# Patient Record
Sex: FEMALE | Race: WHITE | NOT HISPANIC OR LATINO | Employment: OTHER | URBAN - NONMETROPOLITAN AREA
[De-identification: names, ages, dates, MRNs, and addresses within clinical notes are randomized per-mention and may not be internally consistent; named-entity substitution may affect disease eponyms.]

---

## 2022-08-23 ENCOUNTER — OFFICE VISIT (OUTPATIENT)
Dept: CARDIOLOGY | Facility: CLINIC | Age: 82
End: 2022-08-23

## 2022-08-23 VITALS
HEART RATE: 67 BPM | DIASTOLIC BLOOD PRESSURE: 72 MMHG | BODY MASS INDEX: 27.99 KG/M2 | OXYGEN SATURATION: 97 % | HEIGHT: 65 IN | WEIGHT: 168 LBS | SYSTOLIC BLOOD PRESSURE: 140 MMHG

## 2022-08-23 DIAGNOSIS — R06.09 DYSPNEA ON EXERTION: ICD-10-CM

## 2022-08-23 DIAGNOSIS — I20.8 ATYPICAL ANGINA: Primary | ICD-10-CM

## 2022-08-23 DIAGNOSIS — E78.2 MIXED HYPERLIPIDEMIA: ICD-10-CM

## 2022-08-23 DIAGNOSIS — I10 PRIMARY HYPERTENSION: ICD-10-CM

## 2022-08-23 PROCEDURE — 99204 OFFICE O/P NEW MOD 45 MIN: CPT | Performed by: EMERGENCY MEDICINE

## 2022-08-23 PROCEDURE — 93000 ELECTROCARDIOGRAM COMPLETE: CPT | Performed by: EMERGENCY MEDICINE

## 2022-08-23 RX ORDER — LOSARTAN POTASSIUM 50 MG/1
100 TABLET ORAL EVERY MORNING
COMMUNITY
Start: 2022-07-22

## 2022-08-23 RX ORDER — TRIAMTERENE AND HYDROCHLOROTHIAZIDE 75; 50 MG/1; MG/1
1 TABLET ORAL DAILY
Qty: 90 TABLET | Refills: 3 | Status: SHIPPED | OUTPATIENT
Start: 2022-08-23 | End: 2022-12-08

## 2022-08-23 RX ORDER — ASPIRIN 325 MG
TABLET, DELAYED RELEASE (ENTERIC COATED) ORAL
COMMUNITY

## 2022-08-23 RX ORDER — ATENOLOL 50 MG/1
1 TABLET ORAL DAILY
COMMUNITY
Start: 2022-04-18

## 2022-08-23 RX ORDER — ROSUVASTATIN CALCIUM 10 MG/1
10 TABLET, COATED ORAL EVERY MORNING
COMMUNITY
Start: 2022-06-10

## 2022-08-23 RX ORDER — LORAZEPAM 0.5 MG/1
TABLET ORAL
COMMUNITY
Start: 2022-08-17

## 2022-08-23 RX ORDER — LEVOTHYROXINE SODIUM 88 UG/1
88 TABLET ORAL EVERY MORNING
COMMUNITY
Start: 2022-07-01

## 2022-08-23 RX ORDER — DULOXETIN HYDROCHLORIDE 60 MG/1
60 CAPSULE, DELAYED RELEASE ORAL DAILY
COMMUNITY
Start: 2022-07-15

## 2022-08-23 RX ORDER — FOLIC ACID 1 MG/1
1000 TABLET ORAL EVERY MORNING
COMMUNITY
Start: 2022-07-01

## 2022-08-23 RX ORDER — PANTOPRAZOLE SODIUM 40 MG/1
1 TABLET, DELAYED RELEASE ORAL DAILY
COMMUNITY
Start: 2022-05-02

## 2022-08-23 NOTE — PROGRESS NOTES
[facility] - CARDIOLOGY  New Patient Initial Outpatient [facility]    Primary Care Physician: Sienna Allison APRN    Subjective     Chief Complaint   Patient presents with   • Hypertension     NEW PT         History of Present Illness    Vannessa Franz is an 81-year-old female who presents for an initial evaluation. LEE ANN Villarreal is the patient's primary care physician. She is accompanied by an adult male.    Hypertension:  The patient states that she began to notice her blood pressure was increasing earlier in the summer. She states that her nephew passed away, and this may have affected her blood pressure. She states that she measured her blood pressure, and she noticed that her systolic blood pressure was 184 to 186 mmHg. She made an appointment with her primary care physician, and she was prescribed Cozaar. She takes a half pill twice per day, and this has helped. The patient's blood pressure log states that on 07/23/2022, her systolic blood pressure was in the 150 to 160 mmHg range. This was after she began the medication. Now, her systolic blood pressure is in the 130 to 150 systolic mmHg range. Her primary care physician referred the patient to cardiology.     Cardiac symptoms:  The patient denies any dyspnea or chest pain. She states that she does experience muscle spasms in her shoulders, and is unsure if this is related to cardiac issue. Her primary care physician stated that her symptoms could be caused by arthritis, and she was encouraged to go to the emergency room if she is unable to recover from the spasms. She has never had any cardiac testing done.    Medication management:  The patient takes aspirin 325 mg, atenolol, and Synthroid. She was also prescribed Cozaar 50 mg, but she takes a half pill twice daily currently. She also takes a half pill of Crestor 10 mg.    Health maintenance:  The patient denies any abnormal bleeding.    Anxiety:  The patient states that she has experienced  anxiety for an extended amount of time.     Lower extremity edema:  The patient states that she experiences swelling in her right leg. She states that the deep vein thrombosis was in her right leg as well. She normally wears compression socks, but she did not wear them today, and she notices her edema.    Medical history:  The patient had a transient ischemic attack 25 years ago. She has also had kidney issues in the past. She had deep vein thrombosis and thrombosis in her right eye several years ago. She was never on an anticoagulant other than aspirin following discharge from her hospital stay. While she was hospitalized, she had anticoagulant injections. She does not have clear vision in her right eye, and she has had surgery done by Dr. Healy. The patient is not aware of any cardiac issues. She has never been told that she has coronary artery disease, but atherosclerosis is listed in her chart. The patient also has arthritis.    Family history:  The patient's father  from a myocardial infarction. Her mother had frequent thrombosis and chronic bronchitis. The patient's mother was a non-smoker. The patient's brother  at 48 years old from a myocardial infarction. Her 2 brothers also have or had diabetes mellitus.     Review of Systems   Constitutional: Negative for diaphoresis, fever and malaise/fatigue.   HENT: Negative for congestion.    Eyes: Negative for vision loss in left eye and vision loss in right eye.   Cardiovascular: Positive for leg swelling. Negative for chest pain, claudication, dyspnea on exertion, irregular heartbeat, orthopnea, palpitations and syncope.   Respiratory: Negative for cough, shortness of breath and wheezing.    Hematologic/Lymphatic: Negative for adenopathy.   Skin: Negative for rash.   Musculoskeletal: Negative for joint pain and joint swelling.   Gastrointestinal: Negative for abdominal pain, diarrhea, nausea and vomiting.   Neurological: Negative for excessive daytime  "sleepiness, dizziness, focal weakness, light-headedness, numbness and weakness.   Psychiatric/Behavioral: Negative for depression. The patient is nervous/anxious. The patient does not have insomnia.         Otherwise complete ROS reviewed and negative except as mentioned in the HPI.      Past Medical History: No past medical history on file.    Past Surgical History:      Family History: family history includes Heart attack in her father.    Social History:  reports that she has never smoked. She has never used smokeless tobacco. She reports that she does not drink alcohol and does not use drugs.    Medications:  Prior to Admission medications    Medication Sig Start Date End Date Taking? Authorizing Provider   aspirin  MG tablet Take  by mouth.   Yes Damian Blackmon MD   atenolol (TENORMIN) 50 MG tablet Take 1 tablet by mouth Daily. 4/18/22  Yes Damian Blackmon MD   DULoxetine (CYMBALTA) 60 MG capsule Take 60 mg by mouth Daily. 7/15/22  Yes Damian Blackmon MD   folic acid (FOLVITE) 1 MG tablet Take 1,000 mcg by mouth Every Morning. 7/1/22  Yes Damian Blackmon MD   levothyroxine (SYNTHROID, LEVOTHROID) 88 MCG tablet Take 88 mcg by mouth Every Morning. 7/1/22  Yes Damian Blackmon MD   LORazepam (ATIVAN) 0.5 MG tablet TAKE 1 TO 2 TABLETS BY MOUTH TWICE A DAY AS NEEDED FOR ANXIETY 8/17/22  Yes Damian Blackmon MD   losartan (COZAAR) 50 MG tablet Take 50 mg by mouth Every Morning. 7/22/22  Yes Damian Blackmon MD   pantoprazole (PROTONIX) 40 MG EC tablet Take 1 tablet by mouth Daily. 5/2/22  Yes Damian Blackmon MD   rosuvastatin (CRESTOR) 10 MG tablet Take 10 mg by mouth Every Morning. 6/10/22  Yes Damian Blackmon MD   triamterene-hydrochlorothiazide (MAXZIDE) 75-50 MG per tablet Take 1 tablet by mouth Daily. 8/23/22   Lm Levin DO     Allergies:  No Known Allergies    Objective     Vital Signs: /72   Pulse 67   Ht 165.1 cm (65\")   Wt " 76.2 kg (168 lb)   SpO2 97%   BMI 27.96 kg/m²     Vitals and nursing note reviewed.   Constitutional:       Appearance: Normal and healthy appearance. Well-developed and not in distress.   Eyes:      Extraocular Movements: Extraocular movements intact.      Pupils: Pupils are equal, round, and reactive to light.   HENT:      Head: Normocephalic and atraumatic.    Mouth/Throat:      Pharynx: Oropharynx is clear.   Neck:      Vascular: JVD normal.      Trachea: Trachea normal.   Pulmonary:      Effort: Pulmonary effort is normal.      Breath sounds: Normal breath sounds. No wheezing. No rhonchi. No rales.   Cardiovascular:      PMI at left midclavicular line. Normal rate. Regular rhythm. Normal S1. Normal S2.      Murmurs: There is a grade 2/6 systolic murmur.      No gallop. No rub.   Pulses:     Dorsalis pedis: 2+ bilaterally.     Posterior tibial: 2+ bilaterally.  Abdominal:      General: Bowel sounds are normal.      Palpations: Abdomen is soft.      Tenderness: There is no abdominal tenderness.   Musculoskeletal: Normal range of motion.      Cervical back: Normal range of motion and neck supple. Skin:     General: Skin is warm and dry.      Capillary Refill: Capillary refill takes less than 2 seconds.   Feet:      Right foot:      Skin integrity: Skin integrity normal.      Left foot:      Skin integrity: Skin integrity normal.   Neurological:      Mental Status: Alert and oriented to person, place and time.      Cranial Nerves: Cranial nerves are intact.      Sensory: Sensation is intact.      Motor: Motor function is intact.      Coordination: Coordination is intact.   Psychiatric:         Speech: Speech normal.         Behavior: Behavior is cooperative.         Results Reviewed:      ECG 12 Lead    Date/Time: 8/24/2022 12:10 PM  Performed by: Lm Levin DO  Authorized by: Lm Levin DO   Comparison: compared with previous ECG   Similar to previous ECG  Rhythm: sinus  bradycardia  Rate: bradycardic  Conduction: conduction normal  ST Segments: ST segments normal  T Waves: T waves normal  QRS axis: normal  Other findings: left atrial abnormality    Clinical impression: abnormal EKG              No results found for: CHOL, TRIG, HDL, VLDL, LDLHDL  No results found for: HGBA1C    Assessment / Plan        Problem List Items Addressed This Visit        Cardiac and Vasculature    Atypical angina (HCC) - Primary    Relevant Medications    atenolol (TENORMIN) 50 MG tablet    Other Relevant Orders    Stress Test With Myocardial Perfusion (1 Day)    Dyspnea on exertion    Relevant Orders    Adult Transthoracic Echo Complete W/ Cont if Necessary Per Protocol    Primary hypertension    Relevant Medications    atenolol (TENORMIN) 50 MG tablet    losartan (COZAAR) 50 MG tablet    triamterene-hydrochlorothiazide (MAXZIDE) 75-50 MG per tablet    Mixed hyperlipidemia    Relevant Medications    rosuvastatin (CRESTOR) 10 MG tablet        The patient was educated on her ideal blood pressure measurement. The patient will have a Lexiscan nuclear stress test scheduled as well as an echocardiogram. The patient will begin taking hydrochlorothiazide. We also discussed her electrocardiogram results, which were normal. When the results of these tests are obtained, the patient will be contacted.    The patient will follow-up in 3 months.      Transcribed from ambient dictation for Lm Levin DO by Camryn Montemayor.  08/23/22   14:01 CDT    Patient verbalized consent to the visit recording.

## 2022-08-24 PROBLEM — E78.2 MIXED HYPERLIPIDEMIA: Status: ACTIVE | Noted: 2022-08-24

## 2022-08-24 PROBLEM — I10 PRIMARY HYPERTENSION: Status: ACTIVE | Noted: 2022-08-24

## 2022-08-24 PROBLEM — I20.89 ATYPICAL ANGINA: Status: ACTIVE | Noted: 2022-08-24

## 2022-08-24 PROBLEM — R06.09 DYSPNEA ON EXERTION: Status: ACTIVE | Noted: 2022-08-24

## 2022-08-24 PROBLEM — I20.8 ATYPICAL ANGINA: Status: ACTIVE | Noted: 2022-08-24

## 2022-08-30 ENCOUNTER — TELEPHONE (OUTPATIENT)
Dept: CARDIOLOGY | Facility: CLINIC | Age: 82
End: 2022-08-30

## 2022-08-30 NOTE — TELEPHONE ENCOUNTER
Patient has left a VM today stating she had an episode last Saturday of her BP being 44/41, patient states she was very light headed at this time. She has stopped taking new BP medication  you prescribed at [facility] (triamterene-hydrochlorothiazide), patient also stopped the Losartan.     Patients BP today is:  86/52  102/62

## 2022-08-31 ENCOUNTER — TELEPHONE (OUTPATIENT)
Dept: CARDIOLOGY | Facility: CLINIC | Age: 82
End: 2022-08-31

## 2022-08-31 NOTE — TELEPHONE ENCOUNTER
Please call the patient and let her know to continue to hold both of the blood pressure drugs while her blood pressure remains that low.  Please tell her to continue checking it every day and keeping a log.  Thank you    Message text           PATIENT NOTIFIED

## 2022-09-01 NOTE — TELEPHONE ENCOUNTER
Please call the patient and let her know that I reviewed both her stress test and echo she had performed in [place].  Her stress test was low risk for ischemia.  Her echo showed normal systolic and normal diastolic function.  The only abnormalities were mild aortic insufficiency and mild pulmonary hypertension.  We will discuss what both of these means at her follow-up appointment.  Please make sure she has a follow-up with us in the near future.  Thank you    Message text            Patient notified.

## 2022-12-08 ENCOUNTER — OFFICE VISIT (OUTPATIENT)
Dept: CARDIOLOGY | Facility: CLINIC | Age: 82
End: 2022-12-08

## 2022-12-08 VITALS
OXYGEN SATURATION: 98 % | DIASTOLIC BLOOD PRESSURE: 84 MMHG | WEIGHT: 168 LBS | SYSTOLIC BLOOD PRESSURE: 120 MMHG | HEART RATE: 62 BPM | HEIGHT: 65 IN | BODY MASS INDEX: 27.99 KG/M2

## 2022-12-08 DIAGNOSIS — R06.09 DYSPNEA ON EXERTION: ICD-10-CM

## 2022-12-08 DIAGNOSIS — I20.8 ATYPICAL ANGINA: Primary | ICD-10-CM

## 2022-12-08 DIAGNOSIS — I10 PRIMARY HYPERTENSION: ICD-10-CM

## 2022-12-08 DIAGNOSIS — E78.2 MIXED HYPERLIPIDEMIA: ICD-10-CM

## 2022-12-08 PROCEDURE — 99214 OFFICE O/P EST MOD 30 MIN: CPT | Performed by: EMERGENCY MEDICINE

## 2022-12-08 NOTE — PROGRESS NOTES
Subjective:     Encounter Date:12/08/2022      Patient ID: Vannessa Franz is a 82 y.o. female.    Chief Complaint:  History of Present Illness  Ms. Franz is a 82-year-old female who presents today for a follow up. She is accompanied by an adult male.    The patient states she is doing well. She reports the blood pressure medication did not work. She states she called the office and was advised to see her primary provider and she changed it. She notes it made her blood pressure drop too low. She denies having any chest pains but does have shortness of breath occasionally. She contributes the shortness of breath to being overweight and not exercising properly. Her in office blood pressure was 120/84 mmHg and heart rate is 62 beats per minute. She reports she is currently taking aspirin 81mg, atenolol 50 mg, Losartan 100mg, and Crestor 10 mg. She states she mainly gets shortness of breath during exertion.       Review of Systems   Constitutional: Negative for diaphoresis, fever and malaise/fatigue.   HENT: Negative for congestion.    Eyes: Negative for vision loss in left eye and vision loss in right eye.   Cardiovascular: Positive for dyspnea on exertion. Negative for chest pain, claudication, irregular heartbeat, leg swelling, orthopnea, palpitations and syncope.   Respiratory: Positive for shortness of breath. Negative for cough and wheezing.    Hematologic/Lymphatic: Negative for adenopathy.   Skin: Negative for rash.   Musculoskeletal: Negative for joint pain and joint swelling.   Gastrointestinal: Negative for abdominal pain, diarrhea, nausea and vomiting.   Neurological: Negative for excessive daytime sleepiness, dizziness, focal weakness, light-headedness, numbness and weakness.   Psychiatric/Behavioral: Negative for depression. The patient does not have insomnia.            Current Outpatient Medications:   •  aspirin  MG tablet, Take  by mouth., Disp: , Rfl:   •  atenolol (TENORMIN) 50 MG tablet,  Take 1 tablet by mouth Daily., Disp: , Rfl:   •  DULoxetine (CYMBALTA) 60 MG capsule, Take 60 mg by mouth Daily., Disp: , Rfl:   •  folic acid (FOLVITE) 1 MG tablet, Take 1,000 mcg by mouth Every Morning., Disp: , Rfl:   •  levothyroxine (SYNTHROID, LEVOTHROID) 88 MCG tablet, Take 88 mcg by mouth Every Morning., Disp: , Rfl:   •  LORazepam (ATIVAN) 0.5 MG tablet, TAKE 1 TO 2 TABLETS BY MOUTH TWICE A DAY AS NEEDED FOR ANXIETY, Disp: , Rfl:   •  losartan (COZAAR) 50 MG tablet, Take 100 mg by mouth Every Morning., Disp: , Rfl:   •  pantoprazole (PROTONIX) 40 MG EC tablet, Take 1 tablet by mouth Daily., Disp: , Rfl:   •  rosuvastatin (CRESTOR) 10 MG tablet, Take 10 mg by mouth Every Morning., Disp: , Rfl:        Objective:      Vitals:    12/08/22 1036   BP: 120/84   Pulse: 62   SpO2: 98%     Vitals and nursing note reviewed.   Constitutional:       Appearance: Normal and healthy appearance. Well-developed and not in distress.   Eyes:      Extraocular Movements: Extraocular movements intact.      Pupils: Pupils are equal, round, and reactive to light.   HENT:      Head: Normocephalic and atraumatic.    Mouth/Throat:      Pharynx: Oropharynx is clear.   Neck:      Vascular: JVD normal.      Trachea: Trachea normal.   Pulmonary:      Effort: Pulmonary effort is normal.      Breath sounds: Normal breath sounds. No wheezing. No rhonchi. No rales.   Cardiovascular:      PMI at left midclavicular line. Normal rate. Regular rhythm. Normal S1. Normal S2.      Murmurs: There is a grade 2/6 systolic murmur.      No gallop. No rub.   Pulses:     Dorsalis pedis: 2+ bilaterally.     Posterior tibial: 2+ bilaterally.  Abdominal:      General: Bowel sounds are normal.      Palpations: Abdomen is soft.      Tenderness: There is no abdominal tenderness.   Musculoskeletal: Normal range of motion.      Cervical back: Normal range of motion and neck supple. Skin:     General: Skin is warm and dry.      Capillary Refill: Capillary refill  takes less than 2 seconds.   Feet:      Right foot:      Skin integrity: Skin integrity normal.      Left foot:      Skin integrity: Skin integrity normal.   Neurological:      Mental Status: Alert and oriented to person, place and time.      Cranial Nerves: Cranial nerves are intact.      Sensory: Sensation is intact.      Motor: Motor function is intact.      Coordination: Coordination is intact.   Psychiatric:         Speech: Speech normal.         Behavior: Behavior is cooperative.         Lab Review:   Stress test was low risk for ischemia. No signs of blockage.    Echocardiogram showed normal diastolic functions. Only abnormality was mild aortic insufficiency and mild pulmonary hypertension.      Procedures      Assessment/Plan:     Problem List Items Addressed This Visit        Cardiac and Vasculature    Atypical angina (HCC) - Primary    Dyspnea on exertion    Primary hypertension    Mixed hyperlipidemia     PLAN  1. Advised patient to continue taking current medications. If anything changes with her chest pain or shortness of breath, she is to contact the office to get a sooner appointment than 6 months.    ASSESSMENT  1. Shortness of breath    Recommendations/plans:    Transcribed from ambient dictation for Lm Levin DO by Pari Mata.  12/08/22   13:40 CST    Patient or patient representative verbalized consent to the visit recording.  I have personally performed the services described in this document as transcribed by the above individual, and it is both accurate and complete.  Lm Levin DO  12/13/2022  13:11 CST

## 2024-10-03 ENCOUNTER — OFFICE VISIT (OUTPATIENT)
Dept: CARDIOLOGY | Facility: CLINIC | Age: 84
End: 2024-10-03
Payer: MEDICARE

## 2024-10-03 VITALS
BODY MASS INDEX: 28.99 KG/M2 | SYSTOLIC BLOOD PRESSURE: 120 MMHG | OXYGEN SATURATION: 98 % | DIASTOLIC BLOOD PRESSURE: 62 MMHG | HEIGHT: 65 IN | HEART RATE: 69 BPM | WEIGHT: 174 LBS

## 2024-10-03 DIAGNOSIS — I20.89 ATYPICAL ANGINA: ICD-10-CM

## 2024-10-03 DIAGNOSIS — I10 PRIMARY HYPERTENSION: ICD-10-CM

## 2024-10-03 DIAGNOSIS — E78.2 MIXED HYPERLIPIDEMIA: ICD-10-CM

## 2024-10-03 DIAGNOSIS — R06.09 DYSPNEA ON EXERTION: Primary | ICD-10-CM

## 2024-10-03 PROCEDURE — 3074F SYST BP LT 130 MM HG: CPT | Performed by: EMERGENCY MEDICINE

## 2024-10-03 PROCEDURE — 99213 OFFICE O/P EST LOW 20 MIN: CPT | Performed by: EMERGENCY MEDICINE

## 2024-10-03 PROCEDURE — 3078F DIAST BP <80 MM HG: CPT | Performed by: EMERGENCY MEDICINE

## 2024-10-03 RX ORDER — LEVOTHYROXINE SODIUM 100 UG/1
100 TABLET ORAL DAILY
COMMUNITY

## 2024-10-05 PROCEDURE — 93000 ELECTROCARDIOGRAM COMPLETE: CPT | Performed by: EMERGENCY MEDICINE
